# Patient Record
Sex: FEMALE | Race: WHITE | Employment: FULL TIME | URBAN - NONMETROPOLITAN AREA
[De-identification: names, ages, dates, MRNs, and addresses within clinical notes are randomized per-mention and may not be internally consistent; named-entity substitution may affect disease eponyms.]

---

## 2017-08-24 PROBLEM — Z12.31 SCREENING MAMMOGRAM, ENCOUNTER FOR: Status: ACTIVE | Noted: 2017-08-24

## 2017-08-24 PROBLEM — E03.9 ACQUIRED HYPOTHYROIDISM: Status: ACTIVE | Noted: 2017-08-24

## 2017-08-24 PROBLEM — Z12.4 PAP SMEAR FOR CERVICAL CANCER SCREENING: Status: ACTIVE | Noted: 2017-08-24

## 2017-08-27 PROBLEM — E66.09 NON MORBID OBESITY DUE TO EXCESS CALORIES: Status: ACTIVE | Noted: 2017-08-27

## 2017-08-28 RX ORDER — FLUDROCORTISONE ACETATE 0.1 MG/1
0.1 TABLET ORAL DAILY
COMMUNITY
End: 2019-04-22 | Stop reason: SDUPTHER

## 2017-08-28 RX ORDER — LEVOTHYROXINE SODIUM 137 UG/1
137 TABLET ORAL DAILY
COMMUNITY
End: 2019-04-22 | Stop reason: SDUPTHER

## 2017-08-28 RX ORDER — HYDROCORTISONE 10 MG/1
10 TABLET ORAL 2 TIMES DAILY
COMMUNITY
End: 2019-04-22 | Stop reason: SDUPTHER

## 2017-08-31 ENCOUNTER — HOSPITAL ENCOUNTER (OUTPATIENT)
Age: 47
Setting detail: SPECIMEN
Discharge: HOME | End: 2017-08-31
Payer: COMMERCIAL

## 2017-08-31 ENCOUNTER — OFFICE VISIT (OUTPATIENT)
Dept: INTERNAL MEDICINE | Age: 47
End: 2017-08-31
Payer: COMMERCIAL

## 2017-08-31 VITALS
HEIGHT: 65 IN | HEART RATE: 83 BPM | DIASTOLIC BLOOD PRESSURE: 72 MMHG | WEIGHT: 176.4 LBS | SYSTOLIC BLOOD PRESSURE: 118 MMHG | OXYGEN SATURATION: 96 % | BODY MASS INDEX: 29.39 KG/M2

## 2017-08-31 DIAGNOSIS — E03.9 ACQUIRED HYPOTHYROIDISM: ICD-10-CM

## 2017-08-31 DIAGNOSIS — E27.1 ADDISON'S DISEASE (HCC): ICD-10-CM

## 2017-08-31 DIAGNOSIS — Z12.31 SCREENING MAMMOGRAM, ENCOUNTER FOR: ICD-10-CM

## 2017-08-31 DIAGNOSIS — Z12.4 PAP SMEAR FOR CERVICAL CANCER SCREENING: ICD-10-CM

## 2017-08-31 DIAGNOSIS — E78.2 MIXED HYPERLIPIDEMIA: ICD-10-CM

## 2017-08-31 DIAGNOSIS — Z00.00 ANNUAL PHYSICAL EXAM: Primary | ICD-10-CM

## 2017-08-31 DIAGNOSIS — E55.9 VITAMIN D DEFICIENCY: ICD-10-CM

## 2017-08-31 PROBLEM — E78.00 PURE HYPERCHOLESTEROLEMIA: Status: ACTIVE | Noted: 2017-08-31

## 2017-08-31 PROBLEM — E78.00 PURE HYPERCHOLESTEROLEMIA: Status: RESOLVED | Noted: 2017-08-31 | Resolved: 2017-08-31

## 2017-08-31 PROCEDURE — 90715 TDAP VACCINE 7 YRS/> IM: CPT | Performed by: INTERNAL MEDICINE

## 2017-08-31 PROCEDURE — 87624 HPV HI-RISK TYP POOLED RSLT: CPT

## 2017-08-31 PROCEDURE — 88142 CYTOPATH C/V THIN LAYER: CPT

## 2017-08-31 PROCEDURE — 99396 PREV VISIT EST AGE 40-64: CPT | Performed by: INTERNAL MEDICINE

## 2017-08-31 PROCEDURE — 90471 IMMUNIZATION ADMIN: CPT | Performed by: INTERNAL MEDICINE

## 2017-08-31 RX ORDER — MULTIVIT-MIN/IRON/FOLIC ACID/K 18-600-40
CAPSULE ORAL
COMMUNITY

## 2017-08-31 ASSESSMENT — ENCOUNTER SYMPTOMS
VOMITING: 0
ABDOMINAL PAIN: 0
NAUSEA: 0
COLOR CHANGE: 0
DIARRHEA: 0
TROUBLE SWALLOWING: 0
WHEEZING: 0
SORE THROAT: 0
BLOOD IN STOOL: 0
COUGH: 0
VOICE CHANGE: 0
CHEST TIGHTNESS: 0
EYE REDNESS: 0
CONSTIPATION: 0
SINUS PRESSURE: 0
EYE PAIN: 0

## 2017-09-09 LAB
HPV SOURCE: NORMAL
HPV, HIGH RISK: NEGATIVE

## 2017-12-06 ENCOUNTER — OFFICE VISIT (OUTPATIENT)
Dept: INTERNAL MEDICINE | Age: 47
End: 2017-12-06
Payer: COMMERCIAL

## 2017-12-06 VITALS
OXYGEN SATURATION: 98 % | DIASTOLIC BLOOD PRESSURE: 60 MMHG | HEIGHT: 64 IN | SYSTOLIC BLOOD PRESSURE: 100 MMHG | TEMPERATURE: 97.7 F | BODY MASS INDEX: 31.07 KG/M2 | WEIGHT: 182 LBS | HEART RATE: 77 BPM

## 2017-12-06 DIAGNOSIS — D22.9 ATYPICAL MOLE: Primary | ICD-10-CM

## 2017-12-06 DIAGNOSIS — L80 VITILIGO: ICD-10-CM

## 2017-12-06 PROCEDURE — 99213 OFFICE O/P EST LOW 20 MIN: CPT | Performed by: NURSE PRACTITIONER

## 2017-12-06 ASSESSMENT — ENCOUNTER SYMPTOMS
NAUSEA: 0
BACK PAIN: 0
SHORTNESS OF BREATH: 0
COUGH: 0
VOICE CHANGE: 0
COLOR CHANGE: 0
VOMITING: 0
RHINORRHEA: 0
PHOTOPHOBIA: 0

## 2017-12-06 NOTE — PROGRESS NOTES
screen  Completed       Subjective:      Review of Systems   Constitutional: Negative for chills and fever. HENT: Negative for ear pain, hearing loss, rhinorrhea and voice change. Eyes: Negative for photophobia and visual disturbance. Respiratory: Negative for cough and shortness of breath. Cardiovascular: Negative for chest pain and palpitations. Gastrointestinal: Negative for nausea and vomiting. Endocrine: Negative. Negative for cold intolerance and heat intolerance. Genitourinary: Negative for difficulty urinating and flank pain. Musculoskeletal: Negative for back pain and neck pain. Skin: Negative for color change and rash. Allergic/Immunologic: Negative for environmental allergies and food allergies. Neurological: Negative for dizziness, speech difficulty and headaches. Hematological: Does not bruise/bleed easily. Psychiatric/Behavioral: Negative for sleep disturbance and suicidal ideas. Objective:     Physical Exam   Constitutional: She is oriented to person, place, and time. She appears well-developed and well-nourished. HENT:   Head: Atraumatic. Right Ear: External ear normal.   Left Ear: External ear normal.   Nose: Nose normal.   Mouth/Throat: Oropharynx is clear and moist.   Eyes: Conjunctivae are normal. Pupils are equal, round, and reactive to light. Neck: Normal range of motion. Neck supple. Cardiovascular: Normal rate, regular rhythm, S1 normal, S2 normal and normal heart sounds. Pulmonary/Chest: Effort normal and breath sounds normal.   Abdominal: Soft. Bowel sounds are normal.   Musculoskeletal: Normal range of motion. Neurological: She is alert and oriented to person, place, and time. Skin: Skin is warm and dry. Psychiatric: She has a normal mood and affect. Her behavior is normal.   Nursing note and vitals reviewed.     /60 (Site: Left Arm, Position: Sitting)   Pulse 77   Temp 97.7 °F (36.5 °C)   Ht 5' 4\" (1.626 m)   Wt 182 lb

## 2018-04-11 ENCOUNTER — OFFICE VISIT (OUTPATIENT)
Dept: INTERNAL MEDICINE | Age: 48
End: 2018-04-11
Payer: COMMERCIAL

## 2018-04-11 VITALS
OXYGEN SATURATION: 98 % | HEART RATE: 92 BPM | HEIGHT: 64 IN | WEIGHT: 181 LBS | RESPIRATION RATE: 18 BRPM | DIASTOLIC BLOOD PRESSURE: 73 MMHG | SYSTOLIC BLOOD PRESSURE: 96 MMHG | BODY MASS INDEX: 30.9 KG/M2

## 2018-04-11 DIAGNOSIS — E78.2 MIXED HYPERLIPIDEMIA: ICD-10-CM

## 2018-04-11 DIAGNOSIS — E03.9 ACQUIRED HYPOTHYROIDISM: ICD-10-CM

## 2018-04-11 DIAGNOSIS — E55.9 VITAMIN D DEFICIENCY: ICD-10-CM

## 2018-04-11 DIAGNOSIS — E27.1 ADDISON'S DISEASE (HCC): ICD-10-CM

## 2018-04-11 DIAGNOSIS — Z00.00 ANNUAL PHYSICAL EXAM: ICD-10-CM

## 2018-04-11 DIAGNOSIS — E03.9 ACQUIRED HYPOTHYROIDISM: Primary | ICD-10-CM

## 2018-04-11 DIAGNOSIS — E27.1 ADDISON DISEASE (HCC): ICD-10-CM

## 2018-04-11 LAB
ALBUMIN SERPL-MCNC: 5 G/DL (ref 3.5–5.2)
ALP BLD-CCNC: 97 U/L (ref 35–104)
ALT SERPL-CCNC: 27 U/L (ref 5–33)
ANION GAP SERPL CALCULATED.3IONS-SCNC: 20 MMOL/L (ref 7–19)
AST SERPL-CCNC: 32 U/L (ref 5–32)
BILIRUB SERPL-MCNC: 0.4 MG/DL (ref 0.2–1.2)
BUN BLDV-MCNC: 13 MG/DL (ref 6–20)
CALCIUM SERPL-MCNC: 10.3 MG/DL (ref 8.6–10)
CHLORIDE BLD-SCNC: 94 MMOL/L (ref 98–111)
CO2: 23 MMOL/L (ref 22–29)
CREAT SERPL-MCNC: 0.6 MG/DL (ref 0.5–0.9)
GFR NON-AFRICAN AMERICAN: >60
GLUCOSE BLD-MCNC: 85 MG/DL (ref 74–109)
POTASSIUM SERPL-SCNC: 4.3 MMOL/L (ref 3.5–5)
SODIUM BLD-SCNC: 137 MMOL/L (ref 136–145)
T4 FREE: 1.3 NG/DL (ref 0.9–1.7)
TOTAL PROTEIN: 8.3 G/DL (ref 6.6–8.7)
TSH SERPL DL<=0.05 MIU/L-ACNC: 0.88 UIU/ML (ref 0.27–4.2)
VITAMIN D 25-HYDROXY: 30.3 NG/ML

## 2018-04-11 PROCEDURE — 99214 OFFICE O/P EST MOD 30 MIN: CPT | Performed by: INTERNAL MEDICINE

## 2018-04-11 ASSESSMENT — PATIENT HEALTH QUESTIONNAIRE - PHQ9
SUM OF ALL RESPONSES TO PHQ QUESTIONS 1-9: 0
1. LITTLE INTEREST OR PLEASURE IN DOING THINGS: 0
SUM OF ALL RESPONSES TO PHQ9 QUESTIONS 1 & 2: 0
2. FEELING DOWN, DEPRESSED OR HOPELESS: 0

## 2018-04-11 ASSESSMENT — ENCOUNTER SYMPTOMS
COUGH: 0
ABDOMINAL PAIN: 0
SORE THROAT: 0
CHEST TIGHTNESS: 0
WHEEZING: 0
BACK PAIN: 1
CONSTIPATION: 0

## 2018-04-14 LAB — RENIN ACTIVITY: 71 NG/ML/HR

## 2018-05-14 DIAGNOSIS — N85.8 UTERINE MASS: Primary | ICD-10-CM

## 2018-05-16 ENCOUNTER — HOSPITAL ENCOUNTER (OUTPATIENT)
Dept: ULTRASOUND IMAGING | Age: 48
Discharge: HOME | End: 2018-05-16
Payer: COMMERCIAL

## 2018-05-16 ENCOUNTER — TELEPHONE (OUTPATIENT)
Dept: INTERNAL MEDICINE | Age: 48
End: 2018-05-16

## 2018-05-16 DIAGNOSIS — D25.9 UTERINE LEIOMYOMA, UNSPECIFIED LOCATION: Primary | ICD-10-CM

## 2018-05-16 DIAGNOSIS — N85.8 UTERINE MASS: ICD-10-CM

## 2018-05-16 PROCEDURE — 76830 TRANSVAGINAL US NON-OB: CPT

## 2018-05-23 ENCOUNTER — OFFICE VISIT (OUTPATIENT)
Dept: OBGYN | Age: 48
End: 2018-05-23
Payer: COMMERCIAL

## 2018-05-23 VITALS
SYSTOLIC BLOOD PRESSURE: 114 MMHG | WEIGHT: 172 LBS | DIASTOLIC BLOOD PRESSURE: 60 MMHG | HEIGHT: 64 IN | BODY MASS INDEX: 29.37 KG/M2

## 2018-05-23 DIAGNOSIS — D25.1 INTRAMURAL LEIOMYOMA OF UTERUS: Primary | ICD-10-CM

## 2018-05-23 DIAGNOSIS — N85.2 ENLARGED UTERUS: ICD-10-CM

## 2018-05-23 DIAGNOSIS — R14.0 ABDOMINAL BLOATING: ICD-10-CM

## 2018-05-23 PROCEDURE — 99204 OFFICE O/P NEW MOD 45 MIN: CPT | Performed by: OBSTETRICS & GYNECOLOGY

## 2018-05-23 ASSESSMENT — ENCOUNTER SYMPTOMS
RESPIRATORY NEGATIVE: 1
EYES NEGATIVE: 1
GASTROINTESTINAL NEGATIVE: 1

## 2018-06-20 ENCOUNTER — TELEPHONE (OUTPATIENT)
Dept: OBGYN | Age: 48
End: 2018-06-20

## 2018-06-27 ENCOUNTER — OFFICE VISIT (OUTPATIENT)
Dept: OBGYN | Age: 48
End: 2018-06-27

## 2018-06-27 ENCOUNTER — HOSPITAL ENCOUNTER (OUTPATIENT)
Dept: PREADMISSION TESTING | Age: 48
Discharge: HOME | End: 2018-07-01
Payer: COMMERCIAL

## 2018-06-27 VITALS
WEIGHT: 180 LBS | BODY MASS INDEX: 30.73 KG/M2 | SYSTOLIC BLOOD PRESSURE: 112 MMHG | HEIGHT: 64 IN | DIASTOLIC BLOOD PRESSURE: 74 MMHG

## 2018-06-27 VITALS — HEIGHT: 64 IN | BODY MASS INDEX: 30.73 KG/M2 | WEIGHT: 180 LBS

## 2018-06-27 DIAGNOSIS — Z01.818 PRE-OP EXAM: Primary | ICD-10-CM

## 2018-06-27 LAB
ANION GAP SERPL CALCULATED.3IONS-SCNC: 14 MMOL/L (ref 7–19)
BASOPHILS ABSOLUTE: 0.1 K/UL (ref 0–0.2)
BASOPHILS RELATIVE PERCENT: 0.6 % (ref 0–1)
BUN BLDV-MCNC: 10 MG/DL (ref 6–20)
CALCIUM SERPL-MCNC: 9.4 MG/DL (ref 8.6–10)
CHLORIDE BLD-SCNC: 99 MMOL/L (ref 98–111)
CO2: 24 MMOL/L (ref 22–29)
CREAT SERPL-MCNC: 0.6 MG/DL (ref 0.5–0.9)
EOSINOPHILS ABSOLUTE: 0.2 K/UL (ref 0–0.6)
EOSINOPHILS RELATIVE PERCENT: 2.3 % (ref 0–5)
GFR NON-AFRICAN AMERICAN: >60
GLUCOSE BLD-MCNC: 82 MG/DL (ref 74–109)
HCT VFR BLD CALC: 39.2 % (ref 37–47)
HEMOGLOBIN: 13.1 G/DL (ref 12–16)
LYMPHOCYTES ABSOLUTE: 1.9 K/UL (ref 1.1–4.5)
LYMPHOCYTES RELATIVE PERCENT: 19.9 % (ref 20–40)
MCH RBC QN AUTO: 31.1 PG (ref 27–31)
MCHC RBC AUTO-ENTMCNC: 33.4 G/DL (ref 33–37)
MCV RBC AUTO: 93.1 FL (ref 81–99)
MONOCYTES ABSOLUTE: 0.8 K/UL (ref 0–0.9)
MONOCYTES RELATIVE PERCENT: 8.4 % (ref 0–10)
NEUTROPHILS ABSOLUTE: 6.5 K/UL (ref 1.5–7.5)
NEUTROPHILS RELATIVE PERCENT: 68.3 % (ref 50–65)
PDW BLD-RTO: 11.9 % (ref 11.5–14.5)
PLATELET # BLD: 354 K/UL (ref 130–400)
PMV BLD AUTO: 10.9 FL (ref 9.4–12.3)
POTASSIUM SERPL-SCNC: 4.3 MMOL/L (ref 3.5–5)
RBC # BLD: 4.21 M/UL (ref 4.2–5.4)
SODIUM BLD-SCNC: 137 MMOL/L (ref 136–145)
WBC # BLD: 9.5 K/UL (ref 4.8–10.8)

## 2018-06-27 PROCEDURE — 85025 COMPLETE CBC W/AUTO DIFF WBC: CPT

## 2018-06-27 PROCEDURE — PREOPEXAM PRE-OP EXAM: Performed by: OBSTETRICS & GYNECOLOGY

## 2018-06-27 PROCEDURE — 93005 ELECTROCARDIOGRAM TRACING: CPT

## 2018-06-27 PROCEDURE — 80048 BASIC METABOLIC PNL TOTAL CA: CPT

## 2018-06-27 RX ORDER — PHENAZOPYRIDINE HYDROCHLORIDE 100 MG/1
100 TABLET, FILM COATED ORAL ONCE
Status: CANCELLED | OUTPATIENT
Start: 2018-07-13

## 2018-06-27 ASSESSMENT — ENCOUNTER SYMPTOMS
RESPIRATORY NEGATIVE: 1
GASTROINTESTINAL NEGATIVE: 1
EYES NEGATIVE: 1

## 2018-06-27 NOTE — PROGRESS NOTES
Sarah Messina presents today for pre-operative visit of  Neftali Pineda with Maricruz Brandon, cystoscopy, bilateral salpingectomy. History and Physical was performed. Informed consent discussed and she was counseled about the risks of bleeding, infection and damage to organs. She is scheduled for surgery and notified of pre-op arrangements. She is a AB1. Pt had labs drawn this morning.
ampule  1 ampule Inhalation Q4H WA Estelle Santiago MD   1 ampule at 18 1025     Allergies: Patient has no known allergies. Past Medical History:   Diagnosis Date    [place]'s disease ([facility] [address]     Difficult intubation     Hypothyroidism      Past Surgical History:   Procedure Laterality Date     SECTION      x 2    TX LAP, RADICAL HYST W/ TUBE&OV, NODE BX N/A 2018    EXAM UNDER ANESTHIA/ TOTAL LAPAROSCOPIC HYSTERECTOMY WITH DAVINCI/ BILATERAL SALPINGECTOMY / CYSTOSCOPY performed by Estelle Santiago MD at [facility] OR     Family History   Problem Relation Age of Onset    Other Mother         glaucoma    High Blood Pressure Mother     Other Father         hepatic failure, hypothyroidism    Cancer Paternal Aunt         uterine    Heart Attack Maternal Grandfather     Stroke Paternal Grandmother      Social History   Substance Use Topics    Smoking status: Never Smoker    Smokeless tobacco: Never Used    Alcohol use No     /74   Ht 5' 4\" (1.626 m)   Wt 180 lb (81.6 kg)   BMI 30.90 kg/m²     Objective:   Physical Exam   Constitutional: She is oriented to person, place, and time. She appears well-developed and well-nourished. No distress. HENT:   Head: Normocephalic and atraumatic. Mouth/Throat: Oropharynx is clear and moist.   Eyes: Conjunctivae and EOM are normal. Pupils are equal, round, and reactive to light. Neck: Normal range of motion. Pulmonary/Chest: Effort normal. No respiratory distress. Musculoskeletal: Normal range of motion. Neurological: She is alert and oriented to person, place, and time. No cranial nerve deficit. Skin: Skin is warm and dry. No rash noted. Psychiatric: She has a normal mood and affect. Her speech is normal and behavior is normal. Judgment and thought content normal. Cognition and memory are normal.       Assessment:           Diagnosis Orders   1. Pre-op exam        Plan:       1.  Consent signed for [facility] with Katja Ortiz, jose e

## 2018-06-28 LAB
EKG P AXIS: -3 DEGREES
EKG P-R INTERVAL: 148 MS
EKG Q-T INTERVAL: 410 MS
EKG QRS DURATION: 78 MS
EKG QTC CALCULATION (BAZETT): 432 MS
EKG T AXIS: 26 DEGREES

## 2018-07-13 ENCOUNTER — HOSPITAL ENCOUNTER (OUTPATIENT)
Age: 48
Setting detail: OBSERVATION
Discharge: HOME | End: 2018-07-14
Attending: OBSTETRICS & GYNECOLOGY | Admitting: OBSTETRICS & GYNECOLOGY
Payer: COMMERCIAL

## 2018-07-13 ENCOUNTER — ANESTHESIA EVENT (OUTPATIENT)
Dept: OPERATING ROOM | Age: 48
End: 2018-07-13
Payer: COMMERCIAL

## 2018-07-13 ENCOUNTER — ANESTHESIA (OUTPATIENT)
Dept: OPERATING ROOM | Age: 48
End: 2018-07-13
Payer: COMMERCIAL

## 2018-07-13 VITALS
TEMPERATURE: 96.7 F | RESPIRATION RATE: 9 BRPM | SYSTOLIC BLOOD PRESSURE: 122 MMHG | DIASTOLIC BLOOD PRESSURE: 75 MMHG | OXYGEN SATURATION: 100 %

## 2018-07-13 DIAGNOSIS — Z90.710 S/P LAPAROSCOPIC HYSTERECTOMY: Primary | ICD-10-CM

## 2018-07-13 PROBLEM — Z41.9 SURGERY, ELECTIVE: Status: ACTIVE | Noted: 2018-07-13

## 2018-07-13 PROBLEM — T88.4XXA UNEXPECTED DIFFICULT INTUBATION: Status: ACTIVE | Noted: 2018-07-13

## 2018-07-13 LAB — HCG(URINE) PREGNANCY TEST: NEGATIVE

## 2018-07-13 PROCEDURE — 81025 URINE PREGNANCY TEST: CPT

## 2018-07-13 PROCEDURE — 6360000002 HC RX W HCPCS: Performed by: OBSTETRICS & GYNECOLOGY

## 2018-07-13 PROCEDURE — 3700000000 HC ANESTHESIA ATTENDED CARE: Performed by: OBSTETRICS & GYNECOLOGY

## 2018-07-13 PROCEDURE — 2500000003 HC RX 250 WO HCPCS: Performed by: NURSE ANESTHETIST, CERTIFIED REGISTERED

## 2018-07-13 PROCEDURE — 7100000000 HC PACU RECOVERY - FIRST 15 MIN: Performed by: OBSTETRICS & GYNECOLOGY

## 2018-07-13 PROCEDURE — 2580000003 HC RX 258: Performed by: OBSTETRICS & GYNECOLOGY

## 2018-07-13 PROCEDURE — 6370000000 HC RX 637 (ALT 250 FOR IP): Performed by: OBSTETRICS & GYNECOLOGY

## 2018-07-13 PROCEDURE — 94640 AIRWAY INHALATION TREATMENT: CPT

## 2018-07-13 PROCEDURE — G0378 HOSPITAL OBSERVATION PER HR: HCPCS

## 2018-07-13 PROCEDURE — 2780000010 HC IMPLANT OTHER: Performed by: OBSTETRICS & GYNECOLOGY

## 2018-07-13 PROCEDURE — 3700000001 HC ADD 15 MINUTES (ANESTHESIA): Performed by: OBSTETRICS & GYNECOLOGY

## 2018-07-13 PROCEDURE — 2709999900 HC NON-CHARGEABLE SUPPLY: Performed by: OBSTETRICS & GYNECOLOGY

## 2018-07-13 PROCEDURE — 99999 PR OFFICE/OUTPT VISIT,PROCEDURE ONLY: CPT | Performed by: OBSTETRICS & GYNECOLOGY

## 2018-07-13 PROCEDURE — 2720000010 HC SURG SUPPLY STERILE: Performed by: OBSTETRICS & GYNECOLOGY

## 2018-07-13 PROCEDURE — 3600000019 HC SURGERY ROBOT ADDTL 15MIN: Performed by: OBSTETRICS & GYNECOLOGY

## 2018-07-13 PROCEDURE — 3600000009 HC SURGERY ROBOT BASE: Performed by: OBSTETRICS & GYNECOLOGY

## 2018-07-13 PROCEDURE — 6360000002 HC RX W HCPCS: Performed by: NURSE ANESTHETIST, CERTIFIED REGISTERED

## 2018-07-13 PROCEDURE — 7100000001 HC PACU RECOVERY - ADDTL 15 MIN: Performed by: OBSTETRICS & GYNECOLOGY

## 2018-07-13 PROCEDURE — 2500000003 HC RX 250 WO HCPCS: Performed by: OBSTETRICS & GYNECOLOGY

## 2018-07-13 PROCEDURE — 58573 TLH W/T/O UTERUS OVER 250 G: CPT | Performed by: OBSTETRICS & GYNECOLOGY

## 2018-07-13 PROCEDURE — S2900 ROBOTIC SURGICAL SYSTEM: HCPCS | Performed by: OBSTETRICS & GYNECOLOGY

## 2018-07-13 PROCEDURE — 88307 TISSUE EXAM BY PATHOLOGIST: CPT

## 2018-07-13 PROCEDURE — 94664 DEMO&/EVAL PT USE INHALER: CPT

## 2018-07-13 DEVICE — AGENT HEMSTAT 3GM OXIDIZED REGENERATED CELOS ABSRB FOR CONT (ORDER MULTIPLES OF 5EA): Type: IMPLANTABLE DEVICE | Status: FUNCTIONAL

## 2018-07-13 RX ORDER — PROMETHAZINE HYDROCHLORIDE 25 MG/ML
6.25 INJECTION, SOLUTION INTRAMUSCULAR; INTRAVENOUS
Status: DISCONTINUED | OUTPATIENT
Start: 2018-07-13 | End: 2018-07-13 | Stop reason: HOSPADM

## 2018-07-13 RX ORDER — PROPOFOL 10 MG/ML
INJECTION, EMULSION INTRAVENOUS PRN
Status: DISCONTINUED | OUTPATIENT
Start: 2018-07-13 | End: 2018-07-13 | Stop reason: SDUPTHER

## 2018-07-13 RX ORDER — SODIUM CHLORIDE, SODIUM LACTATE, POTASSIUM CHLORIDE, CALCIUM CHLORIDE 600; 310; 30; 20 MG/100ML; MG/100ML; MG/100ML; MG/100ML
INJECTION, SOLUTION INTRAVENOUS CONTINUOUS
Status: DISCONTINUED | OUTPATIENT
Start: 2018-07-13 | End: 2018-07-13

## 2018-07-13 RX ORDER — HYDROMORPHONE HCL IN 0.9% NACL 0.5 MG/ML
0.5 SYRINGE (ML) INTRAVENOUS
Status: DISCONTINUED | OUTPATIENT
Start: 2018-07-13 | End: 2018-07-14 | Stop reason: HOSPADM

## 2018-07-13 RX ORDER — MORPHINE SULFATE 1 MG/ML
4 INJECTION, SOLUTION EPIDURAL; INTRATHECAL; INTRAVENOUS EVERY 5 MIN PRN
Status: DISCONTINUED | OUTPATIENT
Start: 2018-07-13 | End: 2018-07-13 | Stop reason: HOSPADM

## 2018-07-13 RX ORDER — SODIUM CHLORIDE, SODIUM LACTATE, POTASSIUM CHLORIDE, CALCIUM CHLORIDE 600; 310; 30; 20 MG/100ML; MG/100ML; MG/100ML; MG/100ML
INJECTION, SOLUTION INTRAVENOUS CONTINUOUS
Status: DISCONTINUED | OUTPATIENT
Start: 2018-07-13 | End: 2018-07-14 | Stop reason: HOSPADM

## 2018-07-13 RX ORDER — SODIUM CHLORIDE 0.9 % (FLUSH) 0.9 %
10 SYRINGE (ML) INJECTION PRN
Status: DISCONTINUED | OUTPATIENT
Start: 2018-07-13 | End: 2018-07-14 | Stop reason: HOSPADM

## 2018-07-13 RX ORDER — FENTANYL CITRATE 50 UG/ML
INJECTION, SOLUTION INTRAMUSCULAR; INTRAVENOUS PRN
Status: DISCONTINUED | OUTPATIENT
Start: 2018-07-13 | End: 2018-07-13 | Stop reason: SDUPTHER

## 2018-07-13 RX ORDER — SIMETHICONE 80 MG
80 TABLET,CHEWABLE ORAL EVERY 6 HOURS PRN
Status: DISCONTINUED | OUTPATIENT
Start: 2018-07-13 | End: 2018-07-14 | Stop reason: HOSPADM

## 2018-07-13 RX ORDER — DIPHENHYDRAMINE HYDROCHLORIDE 50 MG/ML
12.5 INJECTION INTRAMUSCULAR; INTRAVENOUS
Status: DISCONTINUED | OUTPATIENT
Start: 2018-07-13 | End: 2018-07-13 | Stop reason: HOSPADM

## 2018-07-13 RX ORDER — FENTANYL CITRATE 50 UG/ML
25 INJECTION, SOLUTION INTRAMUSCULAR; INTRAVENOUS
Status: DISCONTINUED | OUTPATIENT
Start: 2018-07-13 | End: 2018-07-13 | Stop reason: HOSPADM

## 2018-07-13 RX ORDER — MIDAZOLAM HYDROCHLORIDE 1 MG/ML
2 INJECTION INTRAMUSCULAR; INTRAVENOUS
Status: DISCONTINUED | OUTPATIENT
Start: 2018-07-13 | End: 2018-07-13 | Stop reason: HOSPADM

## 2018-07-13 RX ORDER — OXYCODONE HYDROCHLORIDE AND ACETAMINOPHEN 5; 325 MG/1; MG/1
1 TABLET ORAL EVERY 4 HOURS PRN
Status: DISCONTINUED | OUTPATIENT
Start: 2018-07-13 | End: 2018-07-14 | Stop reason: HOSPADM

## 2018-07-13 RX ORDER — ENALAPRILAT 2.5 MG/2ML
1.25 INJECTION INTRAVENOUS
Status: DISCONTINUED | OUTPATIENT
Start: 2018-07-13 | End: 2018-07-13 | Stop reason: HOSPADM

## 2018-07-13 RX ORDER — SODIUM CHLORIDE 0.9 % (FLUSH) 0.9 %
10 SYRINGE (ML) INJECTION EVERY 12 HOURS SCHEDULED
Status: DISCONTINUED | OUTPATIENT
Start: 2018-07-13 | End: 2018-07-13 | Stop reason: HOSPADM

## 2018-07-13 RX ORDER — ONDANSETRON 2 MG/ML
4 INJECTION INTRAMUSCULAR; INTRAVENOUS EVERY 8 HOURS PRN
Status: DISCONTINUED | OUTPATIENT
Start: 2018-07-13 | End: 2018-07-14 | Stop reason: HOSPADM

## 2018-07-13 RX ORDER — LIDOCAINE HYDROCHLORIDE 10 MG/ML
1 INJECTION, SOLUTION EPIDURAL; INFILTRATION; INTRACAUDAL; PERINEURAL
Status: DISCONTINUED | OUTPATIENT
Start: 2018-07-13 | End: 2018-07-13 | Stop reason: HOSPADM

## 2018-07-13 RX ORDER — IPRATROPIUM BROMIDE AND ALBUTEROL SULFATE 2.5; .5 MG/3ML; MG/3ML
1 SOLUTION RESPIRATORY (INHALATION)
Status: DISCONTINUED | OUTPATIENT
Start: 2018-07-13 | End: 2018-07-14 | Stop reason: HOSPADM

## 2018-07-13 RX ORDER — DIPHENHYDRAMINE HYDROCHLORIDE 50 MG/ML
25 INJECTION INTRAMUSCULAR; INTRAVENOUS EVERY 6 HOURS PRN
Status: DISCONTINUED | OUTPATIENT
Start: 2018-07-13 | End: 2018-07-14 | Stop reason: HOSPADM

## 2018-07-13 RX ORDER — MORPHINE SULFATE 1 MG/ML
2 INJECTION, SOLUTION EPIDURAL; INTRATHECAL; INTRAVENOUS EVERY 5 MIN PRN
Status: DISCONTINUED | OUTPATIENT
Start: 2018-07-13 | End: 2018-07-13 | Stop reason: HOSPADM

## 2018-07-13 RX ORDER — BUPIVACAINE HYDROCHLORIDE AND EPINEPHRINE 2.5; 5 MG/ML; UG/ML
INJECTION, SOLUTION INFILTRATION; PERINEURAL PRN
Status: DISCONTINUED | OUTPATIENT
Start: 2018-07-13 | End: 2018-07-13 | Stop reason: HOSPADM

## 2018-07-13 RX ORDER — SODIUM CHLORIDE 9 MG/ML
INJECTION, SOLUTION INTRAVENOUS CONTINUOUS
Status: DISCONTINUED | OUTPATIENT
Start: 2018-07-13 | End: 2018-07-13

## 2018-07-13 RX ORDER — SODIUM CHLORIDE 0.9 % (FLUSH) 0.9 %
10 SYRINGE (ML) INJECTION PRN
Status: DISCONTINUED | OUTPATIENT
Start: 2018-07-13 | End: 2018-07-13 | Stop reason: HOSPADM

## 2018-07-13 RX ORDER — SCOLOPAMINE TRANSDERMAL SYSTEM 1 MG/1
1 PATCH, EXTENDED RELEASE TRANSDERMAL
Status: DISCONTINUED | OUTPATIENT
Start: 2018-07-13 | End: 2018-07-13

## 2018-07-13 RX ORDER — PHENAZOPYRIDINE HYDROCHLORIDE 100 MG/1
100 TABLET, FILM COATED ORAL ONCE
Status: COMPLETED | OUTPATIENT
Start: 2018-07-13 | End: 2018-07-13

## 2018-07-13 RX ORDER — HYDROMORPHONE HCL IN 0.9% NACL 0.5 MG/ML
0.25 SYRINGE (ML) INTRAVENOUS
Status: DISCONTINUED | OUTPATIENT
Start: 2018-07-13 | End: 2018-07-14 | Stop reason: HOSPADM

## 2018-07-13 RX ORDER — HYDROMORPHONE HCL IN 0.9% NACL 0.5 MG/ML
0.25 SYRINGE (ML) INTRAVENOUS EVERY 5 MIN PRN
Status: DISCONTINUED | OUTPATIENT
Start: 2018-07-13 | End: 2018-07-13 | Stop reason: HOSPADM

## 2018-07-13 RX ORDER — LIDOCAINE HYDROCHLORIDE 10 MG/ML
INJECTION, SOLUTION INFILTRATION; PERINEURAL PRN
Status: DISCONTINUED | OUTPATIENT
Start: 2018-07-13 | End: 2018-07-13 | Stop reason: SDUPTHER

## 2018-07-13 RX ORDER — HYDROMORPHONE HCL IN 0.9% NACL 0.5 MG/ML
0.5 SYRINGE (ML) INTRAVENOUS EVERY 5 MIN PRN
Status: DISCONTINUED | OUTPATIENT
Start: 2018-07-13 | End: 2018-07-13 | Stop reason: HOSPADM

## 2018-07-13 RX ORDER — APREPITANT 40 MG/1
40 CAPSULE ORAL ONCE
Status: COMPLETED | OUTPATIENT
Start: 2018-07-13 | End: 2018-07-13

## 2018-07-13 RX ORDER — FLUDROCORTISONE ACETATE 0.1 MG/1
0.1 TABLET ORAL DAILY
Status: DISCONTINUED | OUTPATIENT
Start: 2018-07-14 | End: 2018-07-14 | Stop reason: HOSPADM

## 2018-07-13 RX ORDER — EPHEDRINE SULFATE 50 MG/ML
INJECTION, SOLUTION INTRAVENOUS PRN
Status: DISCONTINUED | OUTPATIENT
Start: 2018-07-13 | End: 2018-07-13 | Stop reason: SDUPTHER

## 2018-07-13 RX ORDER — ROCURONIUM BROMIDE 10 MG/ML
INJECTION, SOLUTION INTRAVENOUS PRN
Status: DISCONTINUED | OUTPATIENT
Start: 2018-07-13 | End: 2018-07-13 | Stop reason: SDUPTHER

## 2018-07-13 RX ORDER — FENTANYL CITRATE 50 UG/ML
50 INJECTION, SOLUTION INTRAMUSCULAR; INTRAVENOUS
Status: DISCONTINUED | OUTPATIENT
Start: 2018-07-13 | End: 2018-07-13 | Stop reason: HOSPADM

## 2018-07-13 RX ORDER — LEVOTHYROXINE SODIUM 137 UG/1
137 TABLET ORAL DAILY
Status: DISCONTINUED | OUTPATIENT
Start: 2018-07-14 | End: 2018-07-14 | Stop reason: HOSPADM

## 2018-07-13 RX ORDER — ACETAMINOPHEN 325 MG/1
650 TABLET ORAL EVERY 4 HOURS PRN
Status: DISCONTINUED | OUTPATIENT
Start: 2018-07-13 | End: 2018-07-14 | Stop reason: HOSPADM

## 2018-07-13 RX ORDER — ONDANSETRON 2 MG/ML
INJECTION INTRAMUSCULAR; INTRAVENOUS PRN
Status: DISCONTINUED | OUTPATIENT
Start: 2018-07-13 | End: 2018-07-13 | Stop reason: SDUPTHER

## 2018-07-13 RX ORDER — SODIUM CHLORIDE 0.9 % (FLUSH) 0.9 %
10 SYRINGE (ML) INJECTION EVERY 12 HOURS SCHEDULED
Status: DISCONTINUED | OUTPATIENT
Start: 2018-07-13 | End: 2018-07-14 | Stop reason: HOSPADM

## 2018-07-13 RX ORDER — HYDROCORTISONE 10 MG/1
10 TABLET ORAL 2 TIMES DAILY
Status: DISCONTINUED | OUTPATIENT
Start: 2018-07-13 | End: 2018-07-14 | Stop reason: HOSPADM

## 2018-07-13 RX ORDER — OXYCODONE HYDROCHLORIDE AND ACETAMINOPHEN 5; 325 MG/1; MG/1
2 TABLET ORAL EVERY 4 HOURS PRN
Status: DISCONTINUED | OUTPATIENT
Start: 2018-07-13 | End: 2018-07-14 | Stop reason: HOSPADM

## 2018-07-13 RX ORDER — DOCUSATE SODIUM 100 MG/1
100 CAPSULE, LIQUID FILLED ORAL 2 TIMES DAILY
Status: DISCONTINUED | OUTPATIENT
Start: 2018-07-13 | End: 2018-07-14 | Stop reason: HOSPADM

## 2018-07-13 RX ORDER — LABETALOL HYDROCHLORIDE 5 MG/ML
5 INJECTION, SOLUTION INTRAVENOUS EVERY 10 MIN PRN
Status: DISCONTINUED | OUTPATIENT
Start: 2018-07-13 | End: 2018-07-13 | Stop reason: HOSPADM

## 2018-07-13 RX ORDER — MEPERIDINE HYDROCHLORIDE 50 MG/ML
12.5 INJECTION INTRAMUSCULAR; INTRAVENOUS; SUBCUTANEOUS EVERY 5 MIN PRN
Status: DISCONTINUED | OUTPATIENT
Start: 2018-07-13 | End: 2018-07-13 | Stop reason: HOSPADM

## 2018-07-13 RX ORDER — KETOROLAC TROMETHAMINE 30 MG/ML
30 INJECTION, SOLUTION INTRAMUSCULAR; INTRAVENOUS EVERY 6 HOURS
Status: DISCONTINUED | OUTPATIENT
Start: 2018-07-13 | End: 2018-07-14 | Stop reason: HOSPADM

## 2018-07-13 RX ORDER — METOCLOPRAMIDE HYDROCHLORIDE 5 MG/ML
10 INJECTION INTRAMUSCULAR; INTRAVENOUS
Status: DISCONTINUED | OUTPATIENT
Start: 2018-07-13 | End: 2018-07-13 | Stop reason: HOSPADM

## 2018-07-13 RX ORDER — HYDRALAZINE HYDROCHLORIDE 20 MG/ML
5 INJECTION INTRAMUSCULAR; INTRAVENOUS EVERY 10 MIN PRN
Status: DISCONTINUED | OUTPATIENT
Start: 2018-07-13 | End: 2018-07-13 | Stop reason: HOSPADM

## 2018-07-13 RX ADMIN — SODIUM CHLORIDE, SODIUM LACTATE, POTASSIUM CHLORIDE, AND CALCIUM CHLORIDE: 600; 310; 30; 20 INJECTION, SOLUTION INTRAVENOUS at 13:23

## 2018-07-13 RX ADMIN — MEPERIDINE HYDROCHLORIDE 12.5 MG: 50 INJECTION, SOLUTION INTRAMUSCULAR; INTRAVENOUS; SUBCUTANEOUS at 14:22

## 2018-07-13 RX ADMIN — HYDROCORTISONE 10 MG: 10 TABLET ORAL at 20:12

## 2018-07-13 RX ADMIN — APREPITANT 40 MG: 40 CAPSULE ORAL at 10:15

## 2018-07-13 RX ADMIN — PHENAZOPYRIDINE 100 MG: 100 TABLET ORAL at 08:38

## 2018-07-13 RX ADMIN — EPHEDRINE SULFATE 10 MG: 50 INJECTION, SOLUTION INTRAMUSCULAR; INTRAVENOUS; SUBCUTANEOUS at 11:29

## 2018-07-13 RX ADMIN — HYDROMORPHONE HYDROCHLORIDE 0.5 MG: 1 INJECTION, SOLUTION INTRAMUSCULAR; INTRAVENOUS; SUBCUTANEOUS at 13:47

## 2018-07-13 RX ADMIN — ONDANSETRON HYDROCHLORIDE 4 MG: 2 INJECTION, SOLUTION INTRAMUSCULAR; INTRAVENOUS at 13:35

## 2018-07-13 RX ADMIN — LIDOCAINE HYDROCHLORIDE 50 MG: 10 INJECTION, SOLUTION INFILTRATION; PERINEURAL at 10:35

## 2018-07-13 RX ADMIN — FENTANYL CITRATE 50 MCG: 50 INJECTION INTRAMUSCULAR; INTRAVENOUS at 11:40

## 2018-07-13 RX ADMIN — IPRATROPIUM BROMIDE AND ALBUTEROL SULFATE 1 AMPULE: .5; 3 SOLUTION RESPIRATORY (INHALATION) at 18:53

## 2018-07-13 RX ADMIN — KETOROLAC TROMETHAMINE 30 MG: 30 INJECTION, SOLUTION INTRAMUSCULAR at 20:12

## 2018-07-13 RX ADMIN — HYDROCORTISONE SODIUM SUCCINATE 100 MG: 100 INJECTION, POWDER, FOR SOLUTION INTRAMUSCULAR; INTRAVENOUS at 10:52

## 2018-07-13 RX ADMIN — FENTANYL CITRATE 150 MCG: 50 INJECTION INTRAMUSCULAR; INTRAVENOUS at 10:35

## 2018-07-13 RX ADMIN — Medication 4 MG: at 14:29

## 2018-07-13 RX ADMIN — SODIUM CHLORIDE, SODIUM LACTATE, POTASSIUM CHLORIDE, AND CALCIUM CHLORIDE: 600; 310; 30; 20 INJECTION, SOLUTION INTRAVENOUS at 10:45

## 2018-07-13 RX ADMIN — Medication 2 G: at 10:57

## 2018-07-13 RX ADMIN — SUGAMMADEX 200 MG: 100 INJECTION, SOLUTION INTRAVENOUS at 13:47

## 2018-07-13 RX ADMIN — PROPOFOL 150 MG: 10 INJECTION, EMULSION INTRAVENOUS at 10:35

## 2018-07-13 RX ADMIN — OXYCODONE HYDROCHLORIDE AND ACETAMINOPHEN 1 TABLET: 5; 325 TABLET ORAL at 17:31

## 2018-07-13 RX ADMIN — ROCURONIUM BROMIDE 10 MG: 10 INJECTION INTRAVENOUS at 10:57

## 2018-07-13 RX ADMIN — SODIUM CHLORIDE, SODIUM LACTATE, POTASSIUM CHLORIDE, AND CALCIUM CHLORIDE: 600; 310; 30; 20 INJECTION, SOLUTION INTRAVENOUS at 08:41

## 2018-07-13 RX ADMIN — DOCUSATE SODIUM 100 MG: 100 CAPSULE, LIQUID FILLED ORAL at 20:12

## 2018-07-13 RX ADMIN — ROCURONIUM BROMIDE 10 MG: 10 INJECTION INTRAVENOUS at 13:23

## 2018-07-13 RX ADMIN — EPHEDRINE SULFATE 10 MG: 50 INJECTION, SOLUTION INTRAMUSCULAR; INTRAVENOUS; SUBCUTANEOUS at 12:45

## 2018-07-13 RX ADMIN — FENTANYL CITRATE 50 MCG: 50 INJECTION INTRAMUSCULAR; INTRAVENOUS at 13:16

## 2018-07-13 RX ADMIN — ROCURONIUM BROMIDE 10 MG: 10 INJECTION INTRAVENOUS at 12:29

## 2018-07-13 RX ADMIN — ROCURONIUM BROMIDE 20 MG: 10 INJECTION INTRAVENOUS at 11:56

## 2018-07-13 RX ADMIN — SODIUM CHLORIDE, POTASSIUM CHLORIDE, SODIUM LACTATE AND CALCIUM CHLORIDE: 600; 310; 30; 20 INJECTION, SOLUTION INTRAVENOUS at 15:06

## 2018-07-13 RX ADMIN — ROCURONIUM BROMIDE 40 MG: 10 INJECTION INTRAVENOUS at 10:35

## 2018-07-13 RX ADMIN — HYDROMORPHONE HYDROCHLORIDE 0.5 MG: 1 INJECTION, SOLUTION INTRAMUSCULAR; INTRAVENOUS; SUBCUTANEOUS at 13:50

## 2018-07-13 ASSESSMENT — PAIN SCALES - GENERAL
PAINLEVEL_OUTOF10: 0
PAINLEVEL_OUTOF10: 0
PAINLEVEL_OUTOF10: 7
PAINLEVEL_OUTOF10: 0
PAINLEVEL_OUTOF10: 4
PAINLEVEL_OUTOF10: 7
PAINLEVEL_OUTOF10: 0
PAINLEVEL_OUTOF10: 0
PAINLEVEL_OUTOF10: 5

## 2018-07-13 ASSESSMENT — ENCOUNTER SYMPTOMS
RESPIRATORY NEGATIVE: 1
GASTROINTESTINAL NEGATIVE: 1
EYES NEGATIVE: 1

## 2018-07-13 NOTE — ANESTHESIA PRE PROCEDURE
Department of Anesthesiology  Preprocedure Note       Name:  Canelo Escalante   Age:  51 y.o.  :  1970                                          MRN:  [id number]         Date:  2018      Surgeon: Josefa Mckeon):  Dano Caballero MD    Procedure: Procedure(s):  EXAM UNDER ANESTHIA/ TOTAL LAPAROSCOPIC HYSTERECTOMY WITH DAVINCI/ BILATERAL SALPINGECTOMY / CYSTOSCOPY    Medications prior to admission:   Prior to Admission medications    Medication Sig Start Date End Date Taking?  Authorizing Provider   Cholecalciferol (VITAMIN D) 2000 units CAPS capsule Take by mouth   Yes Historical Provider, MD   levothyroxine (SYNTHROID) 137 MCG tablet Take 137 mcg by mouth Daily    Yes Historical Provider, MD   hydrocortisone (CORTEF) 10 MG tablet Take 10 mg by mouth 2 times daily    Yes Historical Provider, MD   fludrocortisone (FLORINEF) 0.1 MG tablet Take 0.1 mg by mouth daily    Yes Historical Provider, MD       Current medications:    Current Facility-Administered Medications   Medication Dose Route Frequency Provider Last Rate Last Dose    ceFAZolin (ANCEF) 2 g in 0.9% sodium chloride 50 mL IVPB  2 g Intravenous Once Dano Caballero MD        lactated ringers infusion   Intravenous Continuous Dano Caballero  mL/hr at 07 1781         Allergies:  No Known Allergies    Problem List:    Patient Active Problem List   Diagnosis Code    Acquired hypothyroidism E03.9    Pap smear for cervical cancer screening Z12.4    Screening mammogram, encounter for Z12.31    Non morbid obesity due to excess calories E66.09    Annual physical exam Z00.00    Vitamin D deficiency E55.9    Mixed hyperlipidemia E78.2    Vitiligo L80    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       Past Medical History:        Diagnosis Date    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[address]     Hypothyroidism        Past Surgical History:        Procedure Laterality Date     SECTION      x 2       Social History:    Social History   Substance Use Topics    Smoking status: Never Smoker    Smokeless tobacco: Never Used    Alcohol use No                                Counseling given: Not Answered      Vital Signs (Current):   Vitals:    07/13/18 0830   BP: 115/85   Pulse: 82   Resp: 16   Temp: 98.3 °F (36.8 °C)   TempSrc: Temporal   SpO2: 97%                                              BP Readings from Last 3 Encounters:   07/13/18 115/85   06/27/18 112/74   05/23/18 114/60       NPO Status: Time of last liquid consumption: 2300                        Time of last solid consumption: 2300                        Date of last liquid consumption: 07/12/18                        Date of last solid food consumption: 07/12/18    BMI:   Wt Readings from Last 3 Encounters:   06/27/18 180 lb (81.6 kg)   06/27/18 180 lb (81.6 kg)   05/23/18 172 lb (78 kg)     There is no height or weight on file to calculate BMI.    CBC:   Lab Results   Component Value Date    WBC 9.5 06/27/2018    RBC 4.21 06/27/2018    HGB 13.1 06/27/2018    HCT 39.2 06/27/2018    MCV 93.1 06/27/2018    RDW 11.9 06/27/2018     06/27/2018       CMP:   Lab Results   Component Value Date     06/27/2018    K 4.3 06/27/2018    CL 99 06/27/2018    CO2 24 06/27/2018    BUN 10 06/27/2018    CREATININE 0.6 06/27/2018    LABGLOM >60 06/27/2018    GLUCOSE 82 06/27/2018    PROT 8.3 04/11/2018    CALCIUM 9.4 06/27/2018    BILITOT 0.4 04/11/2018    ALKPHOS 97 04/11/2018    AST 32 04/11/2018    ALT 27 04/11/2018       POC Tests: No results for input(s): POCGLU, POCNA, POCK, POCCL, POCBUN, POCHEMO, POCHCT in the last 72 hours.     Coags: No results found for: PROTIME, INR, APTT    HCG (If Applicable):   Lab Results   Component Value Date    PREGTESTUR Negative 07/13/2018        ABGs: No results found for: PHART, PO2ART, TZX9BJV, RYG6DOV, BEART, D4RYRJWM     Type & Screen (If Applicable):  No results found for: LABABO, [address]    Anesthesia Evaluation  Patient summary reviewed and Nursing notes reviewed no history of anesthetic complications:   Airway: Mallampati: III  TM distance: >3 FB   Neck ROM: full  Mouth opening: < 3 FB Dental: normal exam         Pulmonary:Negative Pulmonary ROS breath sounds clear to auscultation                             Cardiovascular:  Exercise tolerance: good (>4 METS),       (-) pacemaker, past MI, CAD, CABG/stent, dysrhythmias and  angina    ECG reviewed  Rhythm: regular             Beta Blocker:  Not on Beta Blocker      ROS comment: EK BPM  Sinus rhythm  Possible anterior infarct - age undetermined  Low QRS voltages in precordial leads  Comparison Summary: No serial comparison made  Summary: Abnormal ECG     Neuro/Psych:      (-) seizures and CVA           GI/Hepatic/Renal:        (-) GERD, liver disease and no renal disease       Endo/Other:    (+) hypothyroidism::., no malignancy/cancer. (-) diabetes mellitus, blood dyscrasia, no malignancy/cancer                ROS comment: Augusto's Disease with chronic steroid use Abdominal:           Vascular:     - DVT and PE. Anesthesia Plan      general     ASA 2     (Emend and scop in preop.)  Induction: intravenous. BIS  MIPS: Postoperative opioids intended and Prophylactic antiemetics administered. Anesthetic plan and risks discussed with patient.                       Gorge Armenta DO   2018

## 2018-07-13 NOTE — H&P
Dexter Galindo is an 51 y.o. female with Enlarged, myomatous uterus and pelvic pain. 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 *  Resolved Problems:    * No resolved hospital problems. *    Blood pressure 115/85, pulse 82, temperature 98.3 °F (36.8 °C), temperature source Temporal, resp. rate 16, last menstrual period 06/01/2018, SpO2 97 %. Review of Systems   Constitutional: Negative. HENT: Negative. Eyes: Negative. Respiratory: Negative. Cardiovascular: Negative. Gastrointestinal: Negative. Genitourinary: Negative. Musculoskeletal: Negative. Skin: Negative. Neurological: Negative. Endo/Heme/Allergies: Negative. Psychiatric/Behavioral: Negative. Physical Exam   Constitutional: She is oriented to person, place, and time. She appears well-developed and well-nourished. No distress. HENT:   Head: Normocephalic and atraumatic. Eyes: Conjunctivae and EOM are normal. Pupils are equal, round, and reactive to light. Neck: Normal range of motion. Neck supple. Cardiovascular: Normal rate and regular rhythm. Pulmonary/Chest: Effort normal and breath sounds normal. No respiratory distress. Abdominal: Soft. She exhibits no distension. There is no tenderness. Musculoskeletal: Normal range of motion. She exhibits no edema or tenderness. Neurological: She is alert and oriented to person, place, and time. Skin: Skin is warm and dry. Psychiatric: She has a normal mood and affect. Her behavior is normal. Judgment and thought content normal.       Assessment:  Enlarged fibroid uterus  Pelvic pain    Plan: To OR for TLH, bilateral salpingectomy, cystoscopy.     Nan Monaco MD  7/13/2018

## 2018-07-14 VITALS
BODY MASS INDEX: 30.73 KG/M2 | OXYGEN SATURATION: 96 % | TEMPERATURE: 98.8 F | HEIGHT: 64 IN | RESPIRATION RATE: 18 BRPM | WEIGHT: 180 LBS | DIASTOLIC BLOOD PRESSURE: 55 MMHG | HEART RATE: 88 BPM | SYSTOLIC BLOOD PRESSURE: 88 MMHG

## 2018-07-14 LAB
ANION GAP SERPL CALCULATED.3IONS-SCNC: 13 MMOL/L (ref 7–19)
BUN BLDV-MCNC: 9 MG/DL (ref 6–20)
CALCIUM SERPL-MCNC: 8 MG/DL (ref 8.6–10)
CHLORIDE BLD-SCNC: 96 MMOL/L (ref 98–111)
CO2: 22 MMOL/L (ref 22–29)
CREAT SERPL-MCNC: 0.6 MG/DL (ref 0.5–0.9)
GFR NON-AFRICAN AMERICAN: >60
GLUCOSE BLD-MCNC: 96 MG/DL (ref 74–109)
HCT VFR BLD CALC: 29.5 % (ref 37–47)
HEMOGLOBIN: 9.5 G/DL (ref 12–16)
MAGNESIUM: 1.8 MG/DL (ref 1.6–2.6)
MCH RBC QN AUTO: 30.5 PG (ref 27–31)
MCHC RBC AUTO-ENTMCNC: 32.2 G/DL (ref 33–37)
MCV RBC AUTO: 94.9 FL (ref 81–99)
PDW BLD-RTO: 11.9 % (ref 11.5–14.5)
PLATELET # BLD: 231 K/UL (ref 130–400)
PMV BLD AUTO: 10.7 FL (ref 9.4–12.3)
POTASSIUM REFLEX MAGNESIUM: 3.4 MMOL/L (ref 3.5–5)
RBC # BLD: 3.11 M/UL (ref 4.2–5.4)
SODIUM BLD-SCNC: 131 MMOL/L (ref 136–145)
WBC # BLD: 9.4 K/UL (ref 4.8–10.8)

## 2018-07-14 PROCEDURE — 96374 THER/PROPH/DIAG INJ IV PUSH: CPT

## 2018-07-14 PROCEDURE — 36415 COLL VENOUS BLD VENIPUNCTURE: CPT

## 2018-07-14 PROCEDURE — 6360000002 HC RX W HCPCS: Performed by: OBSTETRICS & GYNECOLOGY

## 2018-07-14 PROCEDURE — G0378 HOSPITAL OBSERVATION PER HR: HCPCS

## 2018-07-14 PROCEDURE — 94640 AIRWAY INHALATION TREATMENT: CPT

## 2018-07-14 PROCEDURE — 2580000003 HC RX 258: Performed by: OBSTETRICS & GYNECOLOGY

## 2018-07-14 PROCEDURE — 6370000000 HC RX 637 (ALT 250 FOR IP): Performed by: OBSTETRICS & GYNECOLOGY

## 2018-07-14 PROCEDURE — 83735 ASSAY OF MAGNESIUM: CPT

## 2018-07-14 PROCEDURE — 80048 BASIC METABOLIC PNL TOTAL CA: CPT

## 2018-07-14 PROCEDURE — 85027 COMPLETE CBC AUTOMATED: CPT

## 2018-07-14 RX ORDER — IBUPROFEN 800 MG/1
800 TABLET ORAL EVERY 6 HOURS PRN
Qty: 120 TABLET | Refills: 3 | Status: SHIPPED | OUTPATIENT
Start: 2018-07-14 | End: 2019-03-21 | Stop reason: ALTCHOICE

## 2018-07-14 RX ORDER — OXYCODONE HYDROCHLORIDE AND ACETAMINOPHEN 5; 325 MG/1; MG/1
1 TABLET ORAL EVERY 6 HOURS PRN
Qty: 28 TABLET | Refills: 0 | Status: SHIPPED | OUTPATIENT
Start: 2018-07-14 | End: 2018-07-21

## 2018-07-14 RX ADMIN — ONDANSETRON 4 MG: 2 INJECTION INTRAMUSCULAR; INTRAVENOUS at 05:23

## 2018-07-14 RX ADMIN — IPRATROPIUM BROMIDE AND ALBUTEROL SULFATE 1 AMPULE: .5; 3 SOLUTION RESPIRATORY (INHALATION) at 10:25

## 2018-07-14 RX ADMIN — FLUDROCORTISONE ACETATE 0.1 MG: 0.1 TABLET ORAL at 08:17

## 2018-07-14 RX ADMIN — OXYCODONE HYDROCHLORIDE AND ACETAMINOPHEN 1 TABLET: 5; 325 TABLET ORAL at 06:05

## 2018-07-14 RX ADMIN — DOCUSATE SODIUM 100 MG: 100 CAPSULE, LIQUID FILLED ORAL at 08:17

## 2018-07-14 RX ADMIN — LEVOTHYROXINE SODIUM 137 MCG: 137 TABLET ORAL at 06:05

## 2018-07-14 RX ADMIN — HYDROCORTISONE 10 MG: 10 TABLET ORAL at 08:17

## 2018-07-14 RX ADMIN — IPRATROPIUM BROMIDE AND ALBUTEROL SULFATE 1 AMPULE: .5; 3 SOLUTION RESPIRATORY (INHALATION) at 05:36

## 2018-07-14 RX ADMIN — Medication 10 ML: at 05:23

## 2018-07-14 ASSESSMENT — PAIN SCALES - GENERAL: PAINLEVEL_OUTOF10: 2

## 2018-07-14 NOTE — FLOWSHEET NOTE
Pt discharge instructions given to and reviewed with pt. Pt verbalized understanding and denies questions or concerns.

## 2018-07-16 ENCOUNTER — TELEPHONE (OUTPATIENT)
Dept: OBGYN | Age: 48
End: 2018-07-16

## 2018-07-16 NOTE — TELEPHONE ENCOUNTER
Pt called stating is had surgery ( hysterectomy ) on Friday and has rash to stomach. per MS for pt to take Benadryl and if no improvement then pt will call back.  Pt VU

## 2018-07-26 ENCOUNTER — OFFICE VISIT (OUTPATIENT)
Dept: OBGYN | Age: 48
End: 2018-07-26

## 2018-07-26 VITALS
HEART RATE: 77 BPM | SYSTOLIC BLOOD PRESSURE: 102 MMHG | BODY MASS INDEX: 30.27 KG/M2 | HEIGHT: 64 IN | WEIGHT: 177.3 LBS | DIASTOLIC BLOOD PRESSURE: 72 MMHG

## 2018-07-26 DIAGNOSIS — Z09 POSTOP CHECK: Primary | ICD-10-CM

## 2018-07-26 PROCEDURE — 99024 POSTOP FOLLOW-UP VISIT: CPT | Performed by: OBSTETRICS & GYNECOLOGY

## 2018-07-26 NOTE — PATIENT INSTRUCTIONS
of fluids (unless your doctor tells you not to).     · You may notice that your bowel movements are not regular right after your surgery. This is common. Try to avoid constipation and straining with bowel movements. You may want to take a fiber supplement every day. If you have not had a bowel movement after a couple of days, ask your doctor about taking a mild laxative. Medicines    · Your doctor will tell you if and when you can restart your medicines. He or she will also give you instructions about taking any new medicines.     · If you take blood thinners, such as warfarin (Coumadin), clopidogrel (Plavix), or aspirin, be sure to talk to your doctor. He or she will tell you if and when to start taking those medicines again. Make sure that you understand exactly what your doctor wants you to do.     · Be safe with medicines. Take pain medicines exactly as directed. ¨ If the doctor gave you a prescription medicine for pain, take it as prescribed. ¨ If you are not taking a prescription pain medicine, ask your doctor if you can take an over-the-counter medicine.     · If your doctor prescribed antibiotics, take them as directed. Do not stop taking them just because you feel better. You need to take the full course of antibiotics.     · If you think your pain medicine is making you sick to your stomach:  ¨ Take your medicine after meals (unless your doctor has told you not to). ¨ Ask your doctor for a different pain medicine. Incision care    · If you have strips of tape on the cut (incision) the doctor made, leave the tape on for a week or until it falls off. Or follow your doctor's instructions for removing the tape.     · Wash the area daily with warm, soapy water, and pat it dry. Don't use hydrogen peroxide or alcohol, which can slow healing. You may cover the area with a gauze bandage if it weeps or rubs against clothing. Change the bandage every day.     · Keep the area clean and dry.    Other

## 2018-07-26 NOTE — PROGRESS NOTES
Postoperative Follow-up: Patient presents for 2 week follow-up post TLH w Davinci and BILATERAL SALPINGECTOMY, CYSTOSCOPY for MENORRHAGIA and UTERINE FIBROID and enlarged uterus . Eating a regular diet without difficulty. Bowel movements are Normal.  Pain is controlled without any medications. Subjective:       Lindsay Mercedes is a 51 y.o. female who presents to the clinic 2 weeks following status post TLH for pelvic pain, fibroids. Eating a regular diet without difficulty. Bowel movements are Normal.  The patient is not having any pain. Objective:     Physical Exam   Constitutional: She is oriented to person, place, and time. She appears well-developed and well-nourished. No distress. HENT:   Head: Normocephalic and atraumatic. Mouth/Throat: Oropharynx is clear and moist.   Eyes: Conjunctivae and EOM are normal. Pupils are equal, round, and reactive to light. Neck: Normal range of motion. Abdominal: Soft. She exhibits no distension and no mass. There is no tenderness. There is no rebound and no guarding. Incisions C/D/I   Musculoskeletal: Normal range of motion. She exhibits no edema or tenderness. Neurological: She is alert and oriented to person, place, and time. No cranial nerve deficit. Skin: Skin is warm and dry. No rash noted. Psychiatric: She has a normal mood and affect. Her speech is normal and behavior is normal. Judgment and thought content normal. Cognition and memory are normal.   Vitals reviewed. Assessment:      Doing well postoperatively. Operative findings again reviewed. Pathology report discussed. Plan:      1. Continue any current medications. 2. Wound care discussed. 3. Pt is to increase activities as tolerated. May drive starting today. 4. Follow up: 4 weeks   5. Anticipated return to work 4 weeks.

## 2018-07-27 NOTE — DISCHARGE SUMMARY
Physician Discharge Summary     Patient ID:  Stephy Henning  [id number]  19 y.o.  1970    Admit date: 7/13/2018    Discharge date and time: 7/14/2018 12:59 PM     Admitting Physician: Orlando Bullock MD     Discharge Physician: Orlando Bullock      Admission Diagnoses: S/P laparoscopic hysterectomy [Z90.710]  Surgery, elective [Z41.9]    Discharge Diagnoses: Same    Admission Condition: good    Discharged Condition: good    Indication for Admission: Symptomatic, enlarged fibroid uterus admitted for surgical management    Hospital Course: Unremarkable postpartum course d/c'd POD#1    Consults: none    Significant Diagnostic Studies:   Lab Results   Component Value Date    WBC 9.4 07/14/2018    HGB 9.5 (L) 07/14/2018    HCT 29.5 (L) 07/14/2018    MCV 94.9 07/14/2018     07/14/2018     Lab Results   Component Value Date     07/14/2018    K 3.4 07/14/2018    CL 96 07/14/2018    CO2 22 07/14/2018    BUN 9 07/14/2018    CREATININE 0.6 07/14/2018    GLUCOSE 96 07/14/2018    CALCIUM 8.0 07/14/2018          Outstanding Order Results     No orders found from 6/14/2018 to 7/14/2018. Treatments: surgery: TLH      Disposition: home    Patient Instructions:   [unfilled]  Activity: no heavy lifting, pushing, pulling or anything per vagia x 6 weeks and no driving for 2 weeks  Diet: regular diet  Wound Care: keep wound clean and dry    Follow-up with Orlando Bullock   in 2 weeks.     Signed:  Mariely Allison  7/27/2018  1:01 AM

## 2018-08-22 ENCOUNTER — OFFICE VISIT (OUTPATIENT)
Dept: OBGYN | Age: 48
End: 2018-08-22

## 2018-08-22 VITALS
DIASTOLIC BLOOD PRESSURE: 87 MMHG | BODY MASS INDEX: 29.88 KG/M2 | SYSTOLIC BLOOD PRESSURE: 116 MMHG | HEART RATE: 98 BPM | WEIGHT: 175 LBS | HEIGHT: 64 IN

## 2018-08-22 DIAGNOSIS — Z09 POSTOP CHECK: Primary | ICD-10-CM

## 2018-08-22 DIAGNOSIS — Z90.710 S/P HYSTERECTOMY: ICD-10-CM

## 2018-08-22 PROCEDURE — 99024 POSTOP FOLLOW-UP VISIT: CPT | Performed by: OBSTETRICS & GYNECOLOGY

## 2018-08-22 ASSESSMENT — ENCOUNTER SYMPTOMS
RESPIRATORY NEGATIVE: 1
EYES NEGATIVE: 1
GASTROINTESTINAL NEGATIVE: 1

## 2018-08-22 NOTE — PATIENT INSTRUCTIONS
Patient Education        Laparoscopically Assisted Vaginal Hysterectomy: What to Expect at [address] [place] [address] can expect to feel better and stronger each day, although you may need pain medicine for a week or two. You may get tired easily or have less energy than usual. This may last for several weeks after surgery. You will probably notice that your belly is swollen and puffy. This is common. The swelling will take several weeks to go down. It may take about 4 to 6 weeks to fully recover. The recovery time may be less for some patients. You may have some light vaginal bleeding. Don't have sex until the doctor says it is okay. Don't douche or put anything into your vagina, such as a tampon, until your doctor says it is okay. It is important to avoid lifting while you are recovering so that you can heal.  This care sheet gives you a general idea about how long it will take for you to recover. But each person recovers at a different pace. Follow the steps below to get better as quickly as possible. How can you care for yourself at home? Activity    · Rest when you feel tired. Getting enough sleep will help you recover.     · Try to walk each day. Start by walking a little more than you did the day before. Bit by bit, increase the amount you walk. Walking boosts blood flow and helps prevent pneumonia and constipation.     · Avoid lifting anything that would make you strain. This may include heavy grocery bags and milk containers, a heavy briefcase or backpack, cat litter or dog food bags, a vacuum , or a child.     · Avoid strenuous activities, such as biking, jogging, weight lifting, or aerobic exercise, until your doctor says it is okay.     · You may shower. Pat the cut (incision) dry. Do not take a bath for the first 2 weeks, or until your doctor tells you it is okay.     · Ask your doctor when you can drive again.     · You will probably need to take about 2 weeks off from work.  It depends on you learn more? Go to [email or web address] and sign in to your [employer] account. Enter Y601 in the [employer] box to learn more about \"Laparoscopically Assisted Vaginal Hysterectomy: What to Expect at Home. \"     If you do not have an account, please click on the \"Sign Up Now\" link. Current as of: October 6, 2017  Content Version: 11.7  © 0451-3903 [employer]. Care instructions adapted under license by [facility] [address] If you have questions about a medical condition or this instruction, always ask your healthcare professional. [address] any warranty or liability for your use of this information.

## 2018-08-22 NOTE — LETTER
[address] [facility] [address]  [address] [address]  Phone: [phone]  Fax: [phone]    David Hernandez MD        August 22, 2018     Patient: Sekou Cuadra   YOB: 1970   Date of Visit: 8/22/2018       To Whom It May Concern: It is my medical opinion that Sekou Cuadra may return to work on 8/23/2018. If you have any questions or concerns, please don't hesitate to call.     Sincerely,        David Hernandez MD

## 2018-09-26 PROBLEM — Z00.00 ANNUAL PHYSICAL EXAM: Status: RESOLVED | Noted: 2017-08-31 | Resolved: 2018-09-26

## 2018-09-26 PROBLEM — Z12.4 PAP SMEAR FOR CERVICAL CANCER SCREENING: Status: RESOLVED | Noted: 2017-08-24 | Resolved: 2018-09-26

## 2018-09-26 PROBLEM — Z12.31 SCREENING MAMMOGRAM, ENCOUNTER FOR: Status: RESOLVED | Noted: 2017-08-24 | Resolved: 2018-09-26

## 2019-03-21 ENCOUNTER — OFFICE VISIT (OUTPATIENT)
Dept: INTERNAL MEDICINE | Age: 49
End: 2019-03-21
Payer: COMMERCIAL

## 2019-03-21 VITALS
BODY MASS INDEX: 30.05 KG/M2 | HEART RATE: 72 BPM | OXYGEN SATURATION: 98 % | HEIGHT: 64 IN | DIASTOLIC BLOOD PRESSURE: 72 MMHG | SYSTOLIC BLOOD PRESSURE: 132 MMHG | WEIGHT: 176 LBS

## 2019-03-21 DIAGNOSIS — E27.1 ADDISON DISEASE (HCC): ICD-10-CM

## 2019-03-21 DIAGNOSIS — Z12.39 SCREENING BREAST EXAMINATION: Primary | ICD-10-CM

## 2019-03-21 PROCEDURE — 99396 PREV VISIT EST AGE 40-64: CPT | Performed by: INTERNAL MEDICINE

## 2019-03-21 ASSESSMENT — ENCOUNTER SYMPTOMS
SINUS PRESSURE: 0
SORE THROAT: 0
DIARRHEA: 0
NAUSEA: 0
CHEST TIGHTNESS: 0
EYE REDNESS: 0
VOMITING: 0
WHEEZING: 0
CONSTIPATION: 0
BLOOD IN STOOL: 0
VOICE CHANGE: 0
ABDOMINAL PAIN: 0
EYE PAIN: 0
COUGH: 0
COLOR CHANGE: 0
TROUBLE SWALLOWING: 0

## 2019-03-21 ASSESSMENT — PATIENT HEALTH QUESTIONNAIRE - PHQ9
SUM OF ALL RESPONSES TO PHQ QUESTIONS 1-9: 0
1. LITTLE INTEREST OR PLEASURE IN DOING THINGS: 0
2. FEELING DOWN, DEPRESSED OR HOPELESS: 0
SUM OF ALL RESPONSES TO PHQ QUESTIONS 1-9: 0
SUM OF ALL RESPONSES TO PHQ9 QUESTIONS 1 & 2: 0

## 2019-03-24 LAB — RENIN ACTIVITY: 11 NG/ML/HR

## 2019-03-25 ENCOUNTER — PATIENT MESSAGE (OUTPATIENT)
Dept: INTERNAL MEDICINE | Age: 49
End: 2019-03-25

## 2019-03-25 DIAGNOSIS — E27.1 ADDISON DISEASE (HCC): ICD-10-CM

## 2019-03-25 DIAGNOSIS — E03.9 ACQUIRED HYPOTHYROIDISM: Primary | ICD-10-CM

## 2019-04-22 ENCOUNTER — PATIENT MESSAGE (OUTPATIENT)
Dept: INTERNAL MEDICINE | Age: 49
End: 2019-04-22

## 2019-04-22 RX ORDER — LEVOTHYROXINE SODIUM 137 UG/1
137 TABLET ORAL DAILY
Qty: 30 TABLET | Refills: 0 | Status: SHIPPED | OUTPATIENT
Start: 2019-04-22 | End: 2019-05-19 | Stop reason: SDUPTHER

## 2019-04-22 RX ORDER — HYDROCORTISONE 10 MG/1
10 TABLET ORAL 2 TIMES DAILY
Qty: 30 TABLET | Refills: 0 | Status: SHIPPED | OUTPATIENT
Start: 2019-04-22 | End: 2019-09-16 | Stop reason: SDUPTHER

## 2019-04-22 RX ORDER — FLUDROCORTISONE ACETATE 0.1 MG/1
0.1 TABLET ORAL DAILY
Qty: 30 TABLET | Refills: 0 | Status: SHIPPED | OUTPATIENT
Start: 2019-04-22 | End: 2019-05-19 | Stop reason: SDUPTHER

## 2019-04-22 NOTE — TELEPHONE ENCOUNTER
From: Marbella Talley  To: Darcy Scott MD  Sent: 4/22/2019 6:25 AM CDT  Subject: Prescription Question    I forgot my medication at a friends house in [place]. 300 miles away. Is there a way of getting them refilled by you until he can mail me my meds ? I dont have anything I can take until then.  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

## 2019-05-03 ENCOUNTER — HOSPITAL ENCOUNTER (OUTPATIENT)
Dept: WOMENS IMAGING | Age: 49
Discharge: HOME | End: 2019-05-03
Payer: COMMERCIAL

## 2019-05-03 DIAGNOSIS — Z12.39 SCREENING BREAST EXAMINATION: ICD-10-CM

## 2019-05-03 PROCEDURE — 77063 BREAST TOMOSYNTHESIS BI: CPT

## 2019-05-20 RX ORDER — LEVOTHYROXINE SODIUM 137 UG/1
137 TABLET ORAL DAILY
Qty: 30 TABLET | Refills: 5 | Status: SHIPPED | OUTPATIENT
Start: 2019-05-20

## 2019-05-20 RX ORDER — FLUDROCORTISONE ACETATE 0.1 MG/1
0.1 TABLET ORAL DAILY
Qty: 30 TABLET | Refills: 5 | Status: SHIPPED | OUTPATIENT
Start: 2019-05-20 | End: 2019-09-16 | Stop reason: SDUPTHER

## 2019-05-20 NOTE — TELEPHONE ENCOUNTER
Roscoe Darby called requesting a refill of the below medication which has been pended for you:     Requested Prescriptions     Pending Prescriptions Disp Refills    levothyroxine (SYNTHROID) 137 MCG tablet [Pharmacy Med Name: LEVOTHYROXINE 137 MCG TABLET] 30 tablet 0     Sig: TAKE 1 TABLET BY MOUTH DAILY INDICATIONS: UNDERACTIVE THYROID    fludrocortisone (FLORINEF) 0.1 MG tablet [Pharmacy Med Name: FLUDROCORTISONE 0.1 MG TABLET] 30 tablet 0     Sig: TAKE 1 TABLET BY MOUTH DAILY INDICATIONS: SJ'S DISEASE       Last Appointment Date: 3/21/2019  Next Appointment Date: Visit date not found    No Known Allergies

## 2019-09-16 RX ORDER — FLUDROCORTISONE ACETATE 0.1 MG/1
0.1 TABLET ORAL DAILY
Qty: 30 TABLET | Refills: 5 | Status: SHIPPED | OUTPATIENT
Start: 2019-09-16

## 2019-09-16 RX ORDER — HYDROCORTISONE 10 MG/1
10 TABLET ORAL 2 TIMES DAILY
Qty: 30 TABLET | Refills: 0 | Status: SHIPPED | OUTPATIENT
Start: 2019-09-16

## 2020-05-26 ENCOUNTER — PATIENT MESSAGE (OUTPATIENT)
Dept: INTERNAL MEDICINE | Age: 50
End: 2020-05-26

## 2021-05-19 ENCOUNTER — TELEPHONE (OUTPATIENT)
Dept: INTERNAL MEDICINE | Age: 51
End: 2021-05-19

## 2021-05-19 NOTE — TELEPHONE ENCOUNTER
LVM for pt to contact the office to schedule her Physical. Pt has not been seen by Dr. Roula Valencia since 2019

## (undated) DEVICE — ABDOMINAL PAD: Brand: DERMACEA

## (undated) DEVICE — ADHESIVE SKIN CLSR 0.7ML TOP DERMBND ADV

## (undated) DEVICE — SUTURE VCRL SZ 4-0 L27IN ABSRB UD L19MM FS-2 3/8 CIR REV J422H

## (undated) DEVICE — SUTURE VCRL SZ 3-0 L18IN ABSRB UD L26MM SH 1/2 CIR J864D

## (undated) DEVICE — TRAY,FOLEY,100% SIL,16FR 10ML: Brand: MEDLINE

## (undated) DEVICE — CANNULA SEAL

## (undated) DEVICE — DAVINCI: Brand: MEDLINE INDUSTRIES, INC.

## (undated) DEVICE — SUCTION IRRIGATOR: Brand: ENDOWRIST

## (undated) DEVICE — VESSEL SEALER: Brand: ENDOWRIST

## (undated) DEVICE — DOUBLE BASIN PLUS 2-LF: Brand: MEDLINE INDUSTRIES, INC.

## (undated) DEVICE — Device

## (undated) DEVICE — TRI-LUMEN FILTERED TUBE SET WITH ACTIVATED CHARCOAL FILTER: Brand: AIRSEAL

## (undated) DEVICE — BLADELESS OBTURATOR: Brand: WECK VISTA

## (undated) DEVICE — SUTURE VCRL SZ 4-0 L27IN ABSRB UD L19MM PS-2 3/8 CIR PRIM J426H

## (undated) DEVICE — GLOVE SURG SZ 75 L12IN FNGR THK87MIL DK GRN LTX FREE ISOLEX

## (undated) DEVICE — SUTURE VCRL SZ 0 L36IN ABSRB UD L36MM CT-1 1/2 CIR J946H

## (undated) DEVICE — AIRSEAL 8 MM ACCESS PORT AND LOW PROFILE OBTURATOR WITH BLADELESS OPTICAL TIP, 120 MM LENGTH: Brand: AIRSEAL

## (undated) DEVICE — Y-TYPE TUR/BLADDER IRRIGATION SET, REGULATING CLAMP

## (undated) DEVICE — SOLUTION IV 1000ML 0.9% SOD CHL FOR IRRIG PLAS CONT

## (undated) DEVICE — AGENT HEMSTAT W4XL4IN OXIDIZED REGENERATED CELOS STRUCTURED

## (undated) DEVICE — HYDROGEL COATED LATEX FOLEY CATHETER, 5 CC, 2-WAY, 16 FR (5.3 MM): Brand: DOVER

## (undated) DEVICE — ARM DRAPE

## (undated) DEVICE — SOLUTION IV IRRIG POUR BRL 0.9% SODIUM CHL 2F7124

## (undated) DEVICE — VCARE MEDIUM, UTERINE MANIPULATOR, VAGINAL-CERVICAL-AHLUWALIA'S-RETRACTOR-ELEVATOR: Brand: VCARE

## (undated) DEVICE — SECTO® DISSECTOR, ONE-PIECE GAUZE, 5 MM, 380 MM, SINGLE-USE, (10/BX): Brand: SYMMETRY SURGICAL

## (undated) DEVICE — HYPODERMIC SAFETY NEEDLE: Brand: MAGELLAN